# Patient Record
(demographics unavailable — no encounter records)

---

## 2020-03-05 NOTE — MMO
DIGITAL SCREENING MAMMOGRAM:

 

History: 64-year-old for annual digital screening mammography. 

 

Comparison: 3-30-16

 

This study is interpreted with the assistance of computer aided detection. 

 

FINDINGS: 

Bilateral craniocaudal and oblique mediolateral digital screening mammograms obtained and are unremar
kable. No definite evidence of masses or lesions seen. No evidence of architectural distortion seen. 
No suspicious microcalcifications are noted. 

 

IMPRESSION: 

BIRADS category 1 - negative exam. 

 

POS: NIKITA
(4) no limitation